# Patient Record
Sex: MALE | Race: BLACK OR AFRICAN AMERICAN | NOT HISPANIC OR LATINO | URBAN - METROPOLITAN AREA
[De-identification: names, ages, dates, MRNs, and addresses within clinical notes are randomized per-mention and may not be internally consistent; named-entity substitution may affect disease eponyms.]

---

## 2020-04-12 ENCOUNTER — EMERGENCY (EMERGENCY)
Facility: HOSPITAL | Age: 50
LOS: 1 days | Discharge: HOME | End: 2020-04-12
Admitting: EMERGENCY MEDICINE
Payer: SELF-PAY

## 2020-04-12 VITALS
TEMPERATURE: 99 F | OXYGEN SATURATION: 99 % | RESPIRATION RATE: 18 BRPM | HEART RATE: 71 BPM | DIASTOLIC BLOOD PRESSURE: 81 MMHG | SYSTOLIC BLOOD PRESSURE: 135 MMHG

## 2020-04-12 PROCEDURE — 99283 EMERGENCY DEPT VISIT LOW MDM: CPT

## 2020-04-12 RX ORDER — METFORMIN HYDROCHLORIDE 850 MG/1
250 TABLET ORAL
Qty: 60 | Refills: 0
Start: 2020-04-12 | End: 2020-05-11

## 2020-04-12 NOTE — ED PROVIDER NOTE - CLINICAL SUMMARY MEDICAL DECISION MAKING FREE TEXT BOX
presents for medication refill. pt with no other medical complaints.  pt given phone number for ED and can call with his other rx info if he can get the name and dosage of his home HTN medication.  metformin rx given

## 2020-04-12 NOTE — ED ADULT TRIAGE NOTE - CHIEF COMPLAINT QUOTE
walk in pt reporting intermittent dizziness x 1 week and requesting prescription of metformin and bp med unable to recall name in triage. States hx htn and dm, been without meds x 1 month.

## 2020-04-12 NOTE — ED PROVIDER NOTE - CARE PROVIDER_API CALL
Gino Dunlap)  Internal Medicine  [address] [place] [address] [phone]  Phone: [phone]  Fax: [phone]  Follow Up Time:

## 2020-04-12 NOTE — ED PROVIDER NOTE - OBJECTIVE STATEMENT
Pt is 48 yo male with pmhx of DM II, HTN presents requesting refill of metformin.  Pt endorses he also needs refill of htn meds but unknown name and dose.  No medical complaints.  Denies any cp, sob, n/v/d, back pain, leg swelling.  Pt well appearing.

## 2020-04-12 NOTE — ED PROVIDER NOTE - PATIENT PORTAL LINK FT
You can access the FollowMyHealth Patient Portal offered by [facility] by registering at the following website: [email or web address]. By joining [employer]’s FollowMyHealth portal, you will also be able to view your health information using other applications (apps) compatible with our system.

## 2020-04-16 DIAGNOSIS — Z76.0 ENCOUNTER FOR ISSUE OF REPEAT PRESCRIPTION: ICD-10-CM

## 2022-05-03 NOTE — ED PROVIDER NOTE - NS_EDPROVIDERDISPOUSERTYPE_ED_A_ED
Call to pt lvm requesting call back. Stated we are scheduling out to June,  Questioned if new injury/ any changes.   Will route back to providers once I rec'v updated medical. I have personally evaluated and examined the patient. The Attending was available to me as a supervising provider if needed.